# Patient Record
Sex: FEMALE | Race: WHITE | NOT HISPANIC OR LATINO | Employment: FULL TIME | URBAN - METROPOLITAN AREA
[De-identification: names, ages, dates, MRNs, and addresses within clinical notes are randomized per-mention and may not be internally consistent; named-entity substitution may affect disease eponyms.]

---

## 2019-02-05 ENCOUNTER — HOSPITAL ENCOUNTER (EMERGENCY)
Facility: HOSPITAL | Age: 34
Discharge: HOME | End: 2019-02-05
Attending: EMERGENCY MEDICINE | Admitting: EMERGENCY MEDICINE
Payer: OTHER MISCELLANEOUS

## 2019-02-05 VITALS
SYSTOLIC BLOOD PRESSURE: 163 MMHG | WEIGHT: 224.87 LBS | BODY MASS INDEX: 38.6 KG/M2 | HEART RATE: 64 BPM | RESPIRATION RATE: 16 BRPM | OXYGEN SATURATION: 96 % | TEMPERATURE: 98.2 F | DIASTOLIC BLOOD PRESSURE: 74 MMHG

## 2019-02-05 DIAGNOSIS — S61.412A LACERATION OF LEFT HAND WITHOUT FOREIGN BODY, INITIAL ENCOUNTER: Primary | ICD-10-CM

## 2019-02-05 PROCEDURE — 90471 IMMUNIZATION ADMIN: CPT

## 2019-02-05 PROCEDURE — 99283 EMERGENCY DEPT VISIT LOW MDM: CPT

## 2019-02-05 PROCEDURE — 90715 TDAP VACCINE 7 YRS/> IM: CPT | Performed by: EMERGENCY MEDICINE

## 2019-02-05 RX ORDER — ALBUTEROL SULFATE 90 UG/1
2 AEROSOL, METERED RESPIRATORY (INHALATION) EVERY 6 HOURS PRN
COMMUNITY

## 2019-02-05 RX ORDER — NAPROXEN 500 MG/1
500 TABLET ORAL 2 TIMES DAILY WITH MEALS
Qty: 30 TABLET | Refills: 0 | Status: SHIPPED | OUTPATIENT
Start: 2019-02-05

## 2019-02-05 RX ORDER — LIDOCAINE HYDROCHLORIDE 10 MG/ML
10 INJECTION, SOLUTION EPIDURAL; INFILTRATION; INTRACAUDAL; PERINEURAL ONCE
Status: COMPLETED | OUTPATIENT
Start: 2019-02-05 | End: 2019-02-05

## 2019-02-05 RX ADMIN — TETANUS TOXOID, REDUCED DIPHTHERIA TOXOID AND ACELLULAR PERTUSSIS VACCINE, ADSORBED 0.5 ML: 5; 2.5; 8; 8; 2.5 SUSPENSION INTRAMUSCULAR at 08:30

## 2019-02-05 RX ADMIN — LIDOCAINE HYDROCHLORIDE 10 ML: 10 INJECTION, SOLUTION EPIDURAL; INFILTRATION; INTRACAUDAL; PERINEURAL at 08:35

## 2019-02-05 NOTE — ED PROVIDER NOTES
History  Chief Complaint   Patient presents with    Laceration     Approximately 45 minutes ago, pt  cut her left hand with a   History provided by:  Patient   used: No    Laceration   Location:  Hand  Hand laceration location:  L hand  Length:  2-3 inches  Depth: Through muscle  Quality: straight    Bleeding: arterial    Injury mechanism:   Pain details:     Quality:  Aching, throbbing and numbness    Pain severity now: 3/10  Foreign body present:  No foreign bodies  Worsened by: Movement  Ineffective treatments:  None tried  Tetanus status:  Unknown  Associated symptoms: numbness and swelling    Associated symptoms: no fever        Prior to Admission Medications   Prescriptions Last Dose Informant Patient Reported? Taking? albuterol (PROVENTIL HFA,VENTOLIN HFA) 90 mcg/act inhaler   Yes Yes   Sig: Inhale 2 puffs every 6 (six) hours as needed for wheezing      Facility-Administered Medications: None       Past Medical History:   Diagnosis Date    Hypothyroid     Polycystic ovarian syndrome        Past Surgical History:   Procedure Laterality Date    TONSILLECTOMY         History reviewed  No pertinent family history  I have reviewed and agree with the history as documented  Social History   Substance Use Topics    Smoking status: Former Smoker    Smokeless tobacco: Never Used    Alcohol use No        Review of Systems   Constitutional: Negative for fever  Skin: Positive for wound (right hand)  All other systems reviewed and are negative        Physical Exam  ED Triage Vitals [02/05/19 0759]   Temperature Pulse Respirations Blood Pressure SpO2   98 1 °F (36 7 °C) 73 16 (!) 177/74 96 %      Temp Source Heart Rate Source Patient Position - Orthostatic VS BP Location FiO2 (%)   Oral -- Sitting Right arm --      Pain Score       3           Orthostatic Vital Signs  Vitals:    02/05/19 0759   BP: (!) 177/74   Pulse: 73   Patient Position - Orthostatic VS: Sitting       Physical Exam   Constitutional: She is oriented to person, place, and time  She appears well-developed and well-nourished  No distress  HENT:   Head: Normocephalic and atraumatic  Eyes: Pupils are equal, round, and reactive to light  Neck: Normal range of motion  Cardiovascular: Normal rate and regular rhythm  Pulmonary/Chest: Effort normal and breath sounds normal  No respiratory distress  Abdominal: Soft  Bowel sounds are normal  She exhibits no distension  There is no tenderness  Musculoskeletal: She exhibits edema and tenderness  Neurological: She is alert and oriented to person, place, and time  Skin: Skin is warm  Capillary refill takes less than 2 seconds  Psychiatric: She has a normal mood and affect  Her behavior is normal  Judgment and thought content normal        ED Medications  Medications   tetanus-diphtheria-acellular pertussis (BOOSTRIX) IM injection 0 5 mL (0 5 mL Intramuscular Given 2/5/19 1227)   lidocaine (PF) (XYLOCAINE-MPF) 1 % injection 10 mL (10 mL Infiltration Given 2/5/19 4895)       Diagnostic Studies  Results Reviewed     None                 No orders to display         Procedures  Lac Repair  Date/Time: 2/5/2019 9:21 AM  Performed by: Gleda Ahumada  Authorized by: Gleda Ahumada   Consent: Verbal consent obtained  Risks and benefits: risks, benefits and alternatives were discussed  Consent given by: patient  Patient understanding: patient states understanding of the procedure being performed  Patient consent: the patient's understanding of the procedure matches consent given  Site marked: the operative site was marked  Patient identity confirmed: verbally with patient and arm band  Time out: Immediately prior to procedure a "time out" was called to verify the correct patient, procedure, equipment, support staff and site/side marked as required    Body area: upper extremity  Location details: left thumb  Foreign bodies: no foreign bodies  Vascular damage: no  Anesthesia: local infiltration    Anesthesia:  Local Anesthetic: lidocaine 1% without epinephrine  Anesthetic total: 3 mL    Sedation:  Patient sedated: no      Procedure Details:  Preparation: Patient was prepped and draped in the usual sterile fashion  Irrigation solution: saline  Irrigation method: syringe  Amount of cleaning: standard  Debridement: none  Degree of undermining: none  Skin closure: 4-0 nylon  Wound subcutaneous closure material used: 5-0 monocryl  Number of sutures: 9  Technique: horizontal mattress  Approximation: close  Approximation difficulty: simple  Dressing: xeroform + DSD + ACE wrap  Patient tolerance: Patient tolerated the procedure well with no immediate complications            Phone Consults  ED Phone Contact    ED Course         MDM    Disposition  Final diagnoses:   Laceration of left hand without foreign body, initial encounter     Time reflects when diagnosis was documented in both MDM as applicable and the Disposition within this note     Time User Action Codes Description Comment    2/5/2019  9:18 AM Ruth Ann Muriel Add [X40 760R] Laceration of left hand without foreign body, initial encounter       ED Disposition     ED Disposition Condition Date/Time Comment    Discharge  Tue Feb 5, 2019  9:20 AM Jackie Johnson discharge to home/self care  Condition at discharge: Good        Follow-up Information     Follow up With Specialties Details Why [address], MD Orthopedic Surgery   [address]  [phone]            Patient's Medications   Discharge Prescriptions    No medications on file     No discharge procedures on file  ED Provider  Attending physically available and evaluated Jackie Johnson I managed the patient along with the ED Attending      Electronically Signed by         Dax Mcwilliams DPM  02/05/19 4932

## 2019-02-05 NOTE — ED PROVIDER NOTES
Pt Name: Donald Hardy  MRN: [id number]  [place] 1985  Age/Sex: 35 y o  female  Date of evaluation: 2/5/2019  PCP: Elo Valverde, [address] [place] [address]    Chief Complaint   Patient presents with    Laceration     Approximately 45 minutes ago, pt  cut her left hand with a   HPI    Amalia Avila presents to the Emergency Department complaining of left thumb laceration  She was at work at [employer] when she cut herself with a   HPI      Past Medical and Surgical History    Past Medical History:   Diagnosis Date    Hypothyroid     Polycystic ovarian syndrome        Past Surgical History:   Procedure Laterality Date    TONSILLECTOMY         History reviewed  No pertinent family history  Social History   Substance Use Topics    Smoking status: Former Smoker    Smokeless tobacco: Never Used    Alcohol use No              Allergies    No Known Allergies    Home Medications    Prior to Admission medications    Not on File           Review of Systems    Review of Systems   Constitutional: Negative for activity change, appetite change, chills, diaphoresis, fatigue and fever  HENT: Negative for congestion, postnasal drip, rhinorrhea, sinus pressure, sneezing and sore throat  Eyes: Negative for pain and visual disturbance  Respiratory: Negative for cough, chest tightness and shortness of breath  Cardiovascular: Negative for chest pain, palpitations and leg swelling  Gastrointestinal: Negative for abdominal distention, abdominal pain, constipation, diarrhea, nausea and vomiting  Endocrine: Negative for polydipsia, polyphagia and polyuria  Genitourinary: Negative for decreased urine volume, difficulty urinating, dysuria, flank pain, frequency and hematuria  Musculoskeletal: Negative for arthralgias, gait problem, joint swelling and neck pain  Skin: Negative for pallor and rash  Allergic/Immunologic: Negative for immunocompromised state     Neurological: Negative for syncope, speech difficulty, weakness, light-headedness, numbness and headaches  All other systems reviewed and are negative  Physical Exam      ED Triage Vitals   Temperature Pulse Respirations Blood Pressure SpO2   02/05/19 0759 02/05/19 0759 02/05/19 0759 02/05/19 0759 02/05/19 0759   98 1 °F (36 7 °C) 73 16 (!) 177/74 96 %      Temp Source Heart Rate Source Patient Position - Orthostatic VS BP Location FiO2 (%)   02/05/19 0759 02/05/19 0929 02/05/19 0759 02/05/19 0759 --   Oral Monitor Sitting Right arm       Pain Score       02/05/19 0759       3               Physical Exam   Constitutional: She is oriented to person, place, and time  She appears well-developed and well-nourished  No distress  HENT:   Head: Normocephalic and atraumatic  Nose: Nose normal    Mouth/Throat: Oropharynx is clear and moist    Eyes: Pupils are equal, round, and reactive to light  Conjunctivae, EOM and lids are normal    Neck: Normal range of motion  Neck supple  Cardiovascular: Normal rate, regular rhythm and normal heart sounds  Exam reveals no gallop and no friction rub  No murmur heard  Pulmonary/Chest: Effort normal and breath sounds normal  No accessory muscle usage  No respiratory distress  She has no wheezes  She has no rales  Abdominal: Soft  She exhibits no distension  There is no tenderness  There is no rebound and no guarding  Musculoskeletal:        Left hand: She exhibits laceration  Normal sensation noted  Normal strength noted  She exhibits no thumb/finger opposition  Hands:  Neurological: She is alert and oriented to person, place, and time  No cranial nerve deficit or sensory deficit  Skin: Skin is warm and dry  No rash noted  She is not diaphoretic  No erythema  Psychiatric: She has a normal mood and affect  Her speech is normal and behavior is normal  Judgment and thought content normal    Nursing note and vitals reviewed          Assessment and Plan    I saw and evaluated patient with resident who performed wound repair for good approximation  [facility]    Diagnostic Results      Labs:    No results found for this or any previous visit  Procedure    Procedures      ED Course of Care and Re-Assessments    I supervised wound repair by resident  3-4 cm     Medications   tetanus-diphtheria-acellular pertussis (BOOSTRIX) IM injection 0 5 mL (0 5 mL Intramuscular Given 2/5/19 0830)   lidocaine (PF) (XYLOCAINE-MPF) 1 % injection 10 mL (10 mL Infiltration Given 2/5/19 1574)     I personally discussed with the patient the importance of  Hand and work comp related follow up  This is the patient's non dominant hand and tetanus was updated on this visit  FINAL IMPRESSION    Final diagnoses:   Laceration of left hand without foreign body, initial encounter         DISPOSITION/PLAN      Time reflects when diagnosis was documented in both MDM as applicable and the Disposition within this note     Time User Action Codes Description Comment    2/5/2019  9:18 AM Randi Roxy Add [[id number]] Laceration of left hand without foreign body, initial encounter       ED Disposition     ED Disposition Condition Date/Time Comment    Discharge  Tue Feb 5, 2019  9:20 AM Marisel Yoo discharge to home/self care  Condition at discharge: Good        Follow-up Information     Follow up With Specialties Details Why [address], MD Orthopedic Surgery   [address]  [phone]              PATIENT REFERRED TO:    Sai Gilmore MD  [address]  [phone]            DISCHARGE MEDICATIONS:    Discharge Medication List as of 2/5/2019  9:25 AM      CONTINUE these medications which have NOT CHANGED    Details   albuterol (PROVENTIL HFA,VENTOLIN HFA) 90 mcg/act inhaler Inhale 2 puffs every 6 (six) hours as needed for wheezing, Historical Med             No discharge procedures on file           Vesta ROMEO Uriel, DO     Marimar Leslie, DO  02/05/19 502 [facility], DO  02/06/19 6651

## 2019-02-05 NOTE — DISCHARGE INSTRUCTIONS
Laceration   WHAT YOU NEED TO KNOW:   A laceration is an injury to the skin and the soft tissue underneath it  Lacerations happen when you are cut or hit by something  They can happen anywhere on the body  DISCHARGE INSTRUCTIONS:   Seek care immediately if:   · You have heavy bleeding or bleeding that does not stop after 10 minutes of holding firm, direct pressure over the wound  · Your wound opens up  Contact your healthcare provider if:   · You have a fever or chills  · Your laceration is red, warm, or swollen  · You have red streaks on your skin coming from your wound  · You have white or yellow drainage from the wound that smells bad  · You have pain that gets worse, even after treatment  · You have questions or concerns about your condition or care  Medicines:   · Prescription pain medicine  may be given  Ask how to take this medicine safely  · Antibiotics  help treat or prevent a bacterial infection  · Take your medicine as directed  Contact your healthcare provider if you think your medicine is not helping or if you have side effects  Tell him or her if you are allergic to any medicine  Keep a list of the medicines, vitamins, and herbs you take  Include the amounts, and when and why you take them  Bring the list or the pill bottles to follow-up visits  Carry your medicine list with you in case of an emergency  Care for your wound as directed:   · Do not get your wound wet  until your healthcare provider says it is okay  Do not soak your wound in water  Do not go swimming until your healthcare provider says it is okay  Carefully wash the wound with soap and water  Gently pat the area dry or allow it to air dry  · Change your bandages  when they get wet, dirty, or after washing  Apply new, clean bandages as directed  Do not apply elastic bandages or tape too tight  Do not put powders or lotions over your incision  · Apply antibiotic ointment as directed    Your healthcare provider may give you antibiotic ointment to put over your wound if you have stitches  If you have strips of tape over your incision, let them dry up and fall off on their own  If they do not fall off within 14 days, gently remove them  If you have glue over your wound, do not remove or pick at it  If your glue comes off, do not replace it with glue that you have at home  · Check your wound every day for signs of infection such as swelling, redness, or pus  Self-care:   · Apply ice  on your wound for 15 to 20 minutes every hour or as directed  Use an ice pack, or put crushed ice in a plastic bag  Cover it with a towel  Ice helps prevent tissue damage and decreases swelling and pain  · Use a splint as directed  A splint will decrease movement and stress on your wound  It may help it heal faster  A splint may be used for lacerations over joints or areas of your body that bend  Ask your healthcare provider how to apply and remove a splint  · Decrease scarring of your wound  by applying ointments as directed  Do not apply ointments until your healthcare provider says it is okay  You may need to wait until your wound is healed  Ask which ointment to buy and how often to use it  After your wound is healed, use sunscreen over the area when you are out in the sun  You should do this for at least 6 months to 1 year after your injury  Follow up with your healthcare provider as directed: You will need to return in 3 to 14 days to have stitches or staples removed  Write down your questions so you remember to ask them during your visits  © 2017 [employer] Information is for End User's use only and may not be sold, redistributed or otherwise used for commercial purposes  All illustrations and images included in CareNotes® are the copyrighted property of [employer]  or [employer]  The above information is an  only   It is not intended as medical advice for individual conditions or treatments  Talk to your doctor, nurse or pharmacist before following any medical regimen to see if it is safe and effective for you

## 2019-02-08 ENCOUNTER — OFFICE VISIT (OUTPATIENT)
Dept: OBGYN CLINIC | Facility: HOSPITAL | Age: 34
End: 2019-02-08
Payer: OTHER MISCELLANEOUS

## 2019-02-08 VITALS
HEART RATE: 94 BPM | DIASTOLIC BLOOD PRESSURE: 78 MMHG | SYSTOLIC BLOOD PRESSURE: 125 MMHG | WEIGHT: 222.6 LBS | HEIGHT: 64 IN | BODY MASS INDEX: 38 KG/M2

## 2019-02-08 DIAGNOSIS — S61.012A LACERATION OF LEFT THUMB WITHOUT FOREIGN BODY WITHOUT DAMAGE TO NAIL, INITIAL ENCOUNTER: Primary | ICD-10-CM

## 2019-02-08 PROCEDURE — 99203 OFFICE O/P NEW LOW 30 MIN: CPT | Performed by: ORTHOPAEDIC SURGERY

## 2019-02-08 NOTE — LETTER
February 8, 2019     Patient: John Brewer   YOB: 1985   Date of Visit: 2/8/2019       To Whom it May Concern:    John Brewer is under my professional care  She was seen in my office on 2/8/2019  She may return to work on 2/9/19 with no left handed work until follow up next friday  If you have any questions or concerns, please don't hesitate to call           Sincerely,          Rachelle Weiss MD        CC: No Recipients

## 2019-02-13 ENCOUNTER — OFFICE VISIT (OUTPATIENT)
Dept: OBGYN CLINIC | Facility: HOSPITAL | Age: 34
End: 2019-02-13
Payer: OTHER MISCELLANEOUS

## 2019-02-13 VITALS
SYSTOLIC BLOOD PRESSURE: 126 MMHG | BODY MASS INDEX: 37.32 KG/M2 | DIASTOLIC BLOOD PRESSURE: 87 MMHG | HEIGHT: 64 IN | WEIGHT: 218.6 LBS | HEART RATE: 72 BPM

## 2019-02-13 DIAGNOSIS — S61.012A LACERATION OF LEFT THUMB WITHOUT FOREIGN BODY WITHOUT DAMAGE TO NAIL, INITIAL ENCOUNTER: Primary | ICD-10-CM

## 2019-02-13 PROCEDURE — 99213 OFFICE O/P EST LOW 20 MIN: CPT | Performed by: ORTHOPAEDIC SURGERY

## 2019-02-13 NOTE — PROGRESS NOTES
ASSESSMENT/PLAN:    Assessment:   Laceration to the dorsal aspect of left thumb     Plan: It was discussed with the patient that they may now begin to wash their incision site with soap and water  They were instructed to let the steri-strips fall off on their own at this point in time and when they do they may begin to apply lotion to their incision site  Lotion enriched in vitamin E, coca butter, scarzone and maderma was discussed with the patient  Follow Up:  PRN    To Do Next Visit:           _____________________________________________________  CHIEF COMPLAINT:  Chief Complaint   Patient presents with    Left Thumb - Laceration         SUBJECTIVE:  Clyde Jackson is a 35y o  year old female who presents for follow up for her Laceration to the dorsal aspect of left thumb  Since last visit, Clyde Jackson has tried sutures with relief  Today there is No Complaints to the left thumb  Radiation: None  Associated symptoms: No Complaints    PAST MEDICAL HISTORY:  Past Medical History:   Diagnosis Date    Hypothyroid     Polycystic ovarian syndrome        PAST SURGICAL HISTORY:  Past Surgical History:   Procedure Laterality Date    TONSILLECTOMY         FAMILY HISTORY:  No family history on file  SOCIAL HISTORY:  Social History     Tobacco Use    Smoking status: Former Smoker    Smokeless tobacco: Never Used   Substance Use Topics    Alcohol use: No    Drug use: No       MEDICATIONS:    Current Outpatient Medications:     albuterol (PROVENTIL HFA,VENTOLIN HFA) 90 mcg/act inhaler, Inhale 2 puffs every 6 (six) hours as needed for wheezing, Disp: , Rfl:     naproxen (NAPROSYN) 500 mg tablet, Take 1 tablet (500 mg total) by mouth 2 (two) times a day with meals, Disp: 30 tablet, Rfl: 0    ALLERGIES:  No Known Allergies    REVIEW OF SYSTEMS:  Pertinent items are noted in HPI      LABS:  HgA1c: No results found for: HGBA1C  BMP: No results found for: GLUCOSE, CALCIUM, NA, K, CO2, CL, BUN, CREATININE        _____________________________________________________  PHYSICAL EXAMINATION:  General: well developed and well nourished, alert, oriented times 3 and appears comfortable  Psychiatric: Normal  HEENT: Trachea Midline, No torticollis  Cardiovascular: No discernable arrhythmia  Pulmonary: No wheezing or stridor  Skin: healed incision  Neurovascular: Sensation Intact to the Median, Ulnar, Radial Nerve, Motor Intact to the Median, Ulnar, Radial Nerve and Pulses Intact    MUSCULOSKELETAL EXAMINATION:  LEFT SIDE:  thumb: healed laceration to the dorsal aspect of left thumb, fpl and epl intact, full thumb ROM, NVI    _____________________________________________________  STUDIES REVIEWED:  No Studies to review      PROCEDURES PERFORMED:  Procedures  No Procedures performed today   Scribe Attestation    I,:   Gurpreet Montero am acting as a scribe while in the presence of the attending physician :        I,:   Teja Capps MD personally performed the services described in this documentation    as scribed in my presence :

## 2019-02-13 NOTE — PATIENT INSTRUCTIONS
It was discussed with the patient that they may now begin to wash their incision site with soap and water  They were instructed to let the steri-strips fall off on their own at this point in time and when they do they may begin to apply lotion to their incision site  Lotion enriched in vitamin E, coca butter, scarzone and maderma was discussed with the patient